# Patient Record
Sex: MALE | URBAN - NONMETROPOLITAN AREA
[De-identification: names, ages, dates, MRNs, and addresses within clinical notes are randomized per-mention and may not be internally consistent; named-entity substitution may affect disease eponyms.]

---

## 2020-06-29 ENCOUNTER — OFFICE VISIT (OUTPATIENT)
Dept: URBAN - NONMETROPOLITAN AREA SURGERY CENTER 1 | Facility: SURGERY CENTER | Age: 35
End: 2020-06-29

## 2023-08-31 ENCOUNTER — OFFICE VISIT (OUTPATIENT)
Dept: URBAN - NONMETROPOLITAN AREA CLINIC 13 | Facility: CLINIC | Age: 38
End: 2023-08-31

## 2023-12-07 ENCOUNTER — OFFICE VISIT (OUTPATIENT)
Dept: URBAN - NONMETROPOLITAN AREA CLINIC 13 | Facility: CLINIC | Age: 38
End: 2023-12-07
Payer: MEDICARE

## 2023-12-07 ENCOUNTER — DASHBOARD ENCOUNTERS (OUTPATIENT)
Age: 38
End: 2023-12-07

## 2023-12-07 VITALS
SYSTOLIC BLOOD PRESSURE: 133 MMHG | DIASTOLIC BLOOD PRESSURE: 81 MMHG | WEIGHT: 246 LBS | BODY MASS INDEX: 35.22 KG/M2 | HEIGHT: 70 IN | HEART RATE: 88 BPM

## 2023-12-07 DIAGNOSIS — K21.9 CHRONIC GERD: ICD-10-CM

## 2023-12-07 PROBLEM — 235595009: Status: ACTIVE | Noted: 2023-12-07

## 2023-12-07 PROCEDURE — 99204 OFFICE O/P NEW MOD 45 MIN: CPT | Performed by: INTERNAL MEDICINE

## 2023-12-07 PROCEDURE — 99244 OFF/OP CNSLTJ NEW/EST MOD 40: CPT | Performed by: INTERNAL MEDICINE

## 2023-12-07 RX ORDER — OMEPRAZOLE 40 MG/1
1 QD CAPSULE, DELAYED RELEASE PELLETS ORAL
Qty: 0 | Refills: 0 | Status: ACTIVE | COMMUNITY
Start: 1900-01-01

## 2023-12-07 RX ORDER — AMLODIPINE BESYLATE 10 MG/1
TAKE 1 TABLET (10 MG) BY ORAL ROUTE ONCE DAILY TABLET ORAL 1
Qty: 0 | Refills: 0 | Status: ACTIVE | COMMUNITY
Start: 1900-01-01

## 2023-12-07 RX ORDER — VALSARTAN AND HYDROCHLOROTHIAZIDE 160; 25 MG/1; MG/1
1 TABLET TABLET, FILM COATED ORAL ONCE A DAY
Status: ACTIVE | COMMUNITY

## 2023-12-07 RX ORDER — HYDROCHLOROTHIAZIDE 25 MG/1
TAKE 1 TABLET (25 MG) BY ORAL ROUTE ONCE DAILY TABLET ORAL 1
Qty: 0 | Refills: 0 | Status: ACTIVE | COMMUNITY
Start: 1900-01-01

## 2023-12-07 NOTE — HPI-TODAY'S VISIT:
Mr. Canales returns for follow-up.  He is accompanied by his mother.  Last time I saw him we discussed EGD with Erwin to address his underlying cough and to determine if there is a reflux component to this.  In the interim he was taken off his blood pressure medicine and his cough resolved.  I assume that this was a ACE or ARB.  He denies any reflux or heartburn.  His mother states he has not been complaining of symptoms.  He is on omeprazole and nighttime Pepcid.  He denies any dysphagia.  She is wondering if he needs an endoscopy or he could come off his medications.

## 2025-07-29 ENCOUNTER — LAB OUTSIDE AN ENCOUNTER (OUTPATIENT)
Dept: URBAN - NONMETROPOLITAN AREA CLINIC 13 | Facility: CLINIC | Age: 40
End: 2025-07-29

## 2025-07-29 ENCOUNTER — OFFICE VISIT (OUTPATIENT)
Dept: URBAN - NONMETROPOLITAN AREA CLINIC 13 | Facility: CLINIC | Age: 40
End: 2025-07-29
Payer: MEDICARE

## 2025-07-29 DIAGNOSIS — R19.5 OCCULT BLOOD POSITIVE STOOL: ICD-10-CM

## 2025-07-29 DIAGNOSIS — K21.9 CHRONIC GERD: ICD-10-CM

## 2025-07-29 DIAGNOSIS — R93.3 ABNORMAL COMPUTED TOMOGRAPHY OF SIGMOID COLON: ICD-10-CM

## 2025-07-29 PROCEDURE — 99213 OFFICE O/P EST LOW 20 MIN: CPT | Performed by: NURSE PRACTITIONER

## 2025-07-29 RX ORDER — VALSARTAN AND HYDROCHLOROTHIAZIDE 160; 12.5 MG/1; MG/1
TABLET, FILM COATED ORAL
Qty: 90 TABLET | Status: ACTIVE | COMMUNITY

## 2025-07-29 RX ORDER — LISINOPRIL AND HYDROCHLOROTHIAZIDE 12.5; 1 MG/1; MG/1
TABLET ORAL
Qty: 90 TABLET | Status: ACTIVE | COMMUNITY

## 2025-07-29 RX ORDER — AMLODIPINE BESYLATE 10 MG/1
TABLET ORAL
Qty: 90 TABLET | Status: ACTIVE | COMMUNITY

## 2025-07-29 RX ORDER — OMEPRAZOLE 20 MG/1
CAPSULE, DELAYED RELEASE ORAL
Qty: 180 CAPSULE | Status: ACTIVE | COMMUNITY

## 2025-07-29 NOTE — HPI-TODAY'S VISIT:
Patient is a 40-year-old male who presents today for hospital follow-up.  He was seen in the ER in June for diarrhea and abdominal pain.  There was also questionable blood in his stool and an occult was positive.  He had a CT scan showing inflammation across the colon including the terminal ileum and possible Crohn's.  No previous colonoscopy.  No known family history of colorectal cancer or inflammatory bowel disease.  He was treated with ciprofloxacin and Flagyl and a short course of steroids.  Pain, diarrhea, and bleeding ceased and have not recurred.  Recommend colonoscopy for further evaluation. LG.

## 2025-07-29 NOTE — HPI-OTHER HISTORIES
12/2023:  Mr. Canales returns for follow-up. He is accompanied by his mother. Last time I saw him we discussed EGD with Erwin to address his underlying cough and to determine if there is a reflux component to this. In the interim he was taken off his blood pressure medicine and his cough resolved. I assume that this was a ACE or ARB. He denies any reflux or heartburn. His mother states he has not been complaining of symptoms. He is on omeprazole and nighttime Pepcid. He denies any dysphagia. She is wondering if he needs an endoscopy or he could come off his medications.